# Patient Record
Sex: MALE | Race: WHITE | HISPANIC OR LATINO | URBAN - METROPOLITAN AREA
[De-identification: names, ages, dates, MRNs, and addresses within clinical notes are randomized per-mention and may not be internally consistent; named-entity substitution may affect disease eponyms.]

---

## 2025-02-27 ENCOUNTER — OFFICE VISIT (OUTPATIENT)
Dept: URGENT CARE | Age: 20
End: 2025-02-27
Payer: COMMERCIAL

## 2025-02-27 VITALS
RESPIRATION RATE: 18 BRPM | HEART RATE: 122 BPM | TEMPERATURE: 99.5 F | OXYGEN SATURATION: 98 % | WEIGHT: 125 LBS | DIASTOLIC BLOOD PRESSURE: 76 MMHG | BODY MASS INDEX: 17.5 KG/M2 | SYSTOLIC BLOOD PRESSURE: 114 MMHG | HEIGHT: 71 IN

## 2025-02-27 DIAGNOSIS — R05.9 COUGH IN ADULT: ICD-10-CM

## 2025-02-27 LAB
POC RAPID INFLUENZA A: NEGATIVE
POC RAPID INFLUENZA B: NEGATIVE
POC SARS-COV-2 AG BINAX: NORMAL

## 2025-02-27 RX ORDER — BROMPHENIRAMINE MALEATE, PSEUDOEPHEDRINE HYDROCHLORIDE, AND DEXTROMETHORPHAN HYDROBROMIDE 2; 30; 10 MG/5ML; MG/5ML; MG/5ML
5 SYRUP ORAL 4 TIMES DAILY PRN
Qty: 120 ML | Refills: 0 | Status: SHIPPED | OUTPATIENT
Start: 2025-02-27 | End: 2025-03-09

## 2025-02-27 ASSESSMENT — PATIENT HEALTH QUESTIONNAIRE - PHQ9
1. LITTLE INTEREST OR PLEASURE IN DOING THINGS: NOT AT ALL
SUM OF ALL RESPONSES TO PHQ9 QUESTIONS 1 & 2: 0
2. FEELING DOWN, DEPRESSED OR HOPELESS: NOT AT ALL

## 2025-02-27 ASSESSMENT — ENCOUNTER SYMPTOMS
PSYCHIATRIC NEGATIVE: 1
GASTROINTESTINAL NEGATIVE: 1
FEVER: 1
COUGH: 1
ENDOCRINE NEGATIVE: 1
HEMATOLOGIC/LYMPHATIC NEGATIVE: 1
SORE THROAT: 1
PALPITATIONS: 0
HEADACHES: 1
EYES NEGATIVE: 1
MUSCULOSKELETAL NEGATIVE: 1
ALLERGIC/IMMUNOLOGIC NEGATIVE: 1

## 2025-02-27 NOTE — LETTER
February 27, 2025     Patient: Daryl Espinosa   YOB: 2005   Date of Visit: 2/27/2025       To Whom It May Concern:    It is my medical opinion that Daryl Espinosa may return to work on 3/2/25 .    If you have any questions or concerns, please don't hesitate to call.         Sincerely,        GRICELDA Lock-CNP    CC: No Recipients

## 2025-02-27 NOTE — LETTER
February 27, 2025     Patient: Daryl Espinosa   YOB: 2005   Date of Visit: 2/27/2025       To Whom it May Concern:    Daryl Espinosa was seen in my clinic on 2/27/2025. He may return to school on 3/2/25 .    If you have any questions or concerns, please don't hesitate to call.         Sincerely,          GRICELDA Lock-CNP        CC: No Recipients

## 2025-02-27 NOTE — PATIENT INSTRUCTIONS
You have an upper respiratory infection. Symptoms may last for up to 1-2 weeks, but follow up with PCP if your symptoms start worsening. Upper respiratory infections are caused by viruses.  Antibiotics are not indicated for treatment, as antibiotics do not treat viruses.  Treatment is as follows:  For muscle aches, fever, chills: Acetaminophen or Ibuprofen, Advil, or Aleve can be used.  Hydration: Maintain adequate hydration with water.  Nasal symptoms: Nasal Saline available over-the-counter, can be used 3-4 times per day  Decongestants reduce nasal congestion and discharge.  Vaseline at opening of nares may reduce irritation.   Cool Mist Humidifier may loosens discharge.  Cough Suppressants or expectorants may be taken over-the-counter  Start taking a nasal steroid which may include: Flonase, Nasacort, or Nasonex and use as directed. These medications are now available over the counter.

## 2025-02-27 NOTE — PROGRESS NOTES
"Subjective   Patient ID: Daryl Espinosa is a 19 y.o. male. They present today with a chief complaint of Generalized Body Aches, Headache, Chills (/), Fever (Was 102 /Taken Tylenol at 2 pm ), Cough, Nasal Congestion, and Nausea.    History of Present Illness    Headache  Associated symptoms: congestion, cough, fever and sore throat    Fever   Associated symptoms include congestion, coughing, headaches and a sore throat. Pertinent negatives include no chest pain.   Cough  Associated symptoms include a fever, headaches and a sore throat. Pertinent negatives include no chest pain.       Pt presents to urgent care with c/o  body aches, chills, headache, fever, cough, congestion, nausea x 4 days .  Pt denies CP, SOB, palpitations, fevers, abd pain, n/v/d, sick contacts, recent travel.        Past Medical History  Allergies as of 02/27/2025    (No Known Allergies)       (Not in a hospital admission)       No past medical history on file.    No past surgical history on file.     reports that he has never smoked. He has never been exposed to tobacco smoke. He has never used smokeless tobacco.    Review of Systems  Review of Systems   Constitutional:  Positive for fever.   HENT:  Positive for congestion and sore throat.    Eyes: Negative.    Respiratory:  Positive for cough.    Cardiovascular:  Negative for chest pain and palpitations.   Gastrointestinal: Negative.    Endocrine: Negative.    Genitourinary: Negative.    Musculoskeletal: Negative.    Skin: Negative.    Allergic/Immunologic: Negative.    Neurological:  Positive for headaches.   Hematological: Negative.    Psychiatric/Behavioral: Negative.     All other systems reviewed and are negative.                                 Objective    Vitals:    02/27/25 1744   BP: 114/76   BP Location: Left arm   Patient Position: Sitting   Pulse: (!) 122   Resp: 18   Temp: 37.5 °C (99.5 °F)   SpO2: 98%   Weight: 56.7 kg (125 lb)   Height: 1.803 m (5' 11\")     No LMP for male " patient.    Physical Exam  Vitals and nursing note reviewed.   Constitutional:       General: He is not in acute distress.     Appearance: Normal appearance. He is not ill-appearing or toxic-appearing.   HENT:      Head: Atraumatic.      Right Ear: Tympanic membrane, ear canal and external ear normal.      Left Ear: Tympanic membrane, ear canal and external ear normal.      Nose: Congestion present.      Mouth/Throat:      Mouth: Mucous membranes are moist.      Pharynx: Oropharynx is clear.   Eyes:      Extraocular Movements: Extraocular movements intact.      Conjunctiva/sclera: Conjunctivae normal.      Pupils: Pupils are equal, round, and reactive to light.   Cardiovascular:      Rate and Rhythm: Normal rate.      Pulses: Normal pulses.      Heart sounds: Normal heart sounds.   Pulmonary:      Effort: Pulmonary effort is normal.      Breath sounds: Normal breath sounds.   Skin:     General: Skin is warm and dry.   Neurological:      General: No focal deficit present.      Mental Status: He is alert and oriented to person, place, and time.   Psychiatric:         Mood and Affect: Mood normal.         Behavior: Behavior normal.         Thought Content: Thought content normal.         Procedures    Point of Care Test & Imaging Results from this visit  Results for orders placed or performed in visit on 02/27/25   POCT Covid-19 Rapid Antigen   Result Value Ref Range    POC SUKUMAR-COV-2 AG  Presumptive negative test for SARS-CoV-2 (no antigen detected)     Presumptive negative test for SARS-CoV-2 (no antigen detected)   POCT Influenza A/B manually resulted   Result Value Ref Range    POC Rapid Influenza A Negative Negative    POC Rapid Influenza B Negative Negative      No results found.    Diagnostic study results (if any) were reviewed by JUAN Lock.    Assessment/Plan   Allergies, medications, history, and pertinent labs/EKGs/Imaging reviewed by JUAN Lock.     Medical Decision Making  Pt  presents with 4 day history of body aches, chills, cough, congestion.  Rapid covid and influenza negative.  Lung sounds CTA in all fields.  Exam is otherwise unremarkable.  Suspect viral URI ads cause of pt's symptoms.  Recommend supportive care, otc tylenol/motrin, increase oral fluids, rest.  Will send Rx bromfed.  At time of discharge patient was clinically well-appearing and HDS for outpatient management. The patient was educated regarding diagnosis, supportive care, OTC and Rx medications. The patient was given the opportunity to ask questions prior to discharge.  They verbalized understanding of my discussion of the plans for treatment, expected course, indications to return to  or seek further evaluation in ED, and the need for timely follow up as directed.   They were provided with a work/school excuse as requested.       Orders and Diagnoses  Diagnoses and all orders for this visit:  Cough in adult  -     POCT Covid-19 Rapid Antigen  -     POCT Influenza A/B manually resulted  -     brompheniramine-pseudoeph-DM 2-30-10 mg/5 mL syrup; Take 5 mL by mouth 4 times a day as needed for congestion or cough for up to 10 days.      Medical Admin Record      Patient disposition: Home    Electronically signed by JUAN Lock  6:22 PM